# Patient Record
Sex: MALE | Employment: OTHER | URBAN - METROPOLITAN AREA
[De-identification: names, ages, dates, MRNs, and addresses within clinical notes are randomized per-mention and may not be internally consistent; named-entity substitution may affect disease eponyms.]

---

## 2018-06-13 ENCOUNTER — OFFICE VISIT (OUTPATIENT)
Dept: CARDIOLOGY CLINIC | Age: 68
End: 2018-06-13

## 2018-06-13 DIAGNOSIS — R06.02 SOB (SHORTNESS OF BREATH): Primary | ICD-10-CM

## 2018-06-13 DIAGNOSIS — I10 ESSENTIAL HYPERTENSION: ICD-10-CM

## 2018-06-13 DIAGNOSIS — Z76.89 ENCOUNTER TO ESTABLISH CARE: ICD-10-CM

## 2018-06-13 DIAGNOSIS — R53.83 FATIGUE, UNSPECIFIED TYPE: ICD-10-CM

## 2018-06-13 DIAGNOSIS — Z90.49 H/O COLECTOMY: ICD-10-CM

## 2018-06-13 PROBLEM — E78.2 MIXED HYPERLIPIDEMIA: Status: ACTIVE | Noted: 2018-06-13

## 2018-06-13 PROBLEM — E66.01 SEVERE OBESITY (BMI 35.0-39.9): Status: ACTIVE | Noted: 2018-06-13

## 2018-06-13 RX ORDER — CIPROFLOXACIN 500 MG/1
TABLET ORAL 2 TIMES DAILY
COMMUNITY

## 2018-06-13 RX ORDER — FLUTICASONE PROPIONATE AND SALMETEROL 250; 50 UG/1; UG/1
1 POWDER RESPIRATORY (INHALATION) DAILY
COMMUNITY

## 2018-06-13 RX ORDER — METRONIDAZOLE 500 MG/1
TABLET ORAL 2 TIMES DAILY
COMMUNITY

## 2018-06-13 RX ORDER — LISINOPRIL 10 MG/1
TABLET ORAL 2 TIMES DAILY
COMMUNITY

## 2018-06-13 RX ORDER — FENOFIBRATE 145 MG/1
TABLET, COATED ORAL DAILY
COMMUNITY
Start: 2018-04-20

## 2018-06-13 RX ORDER — ASPIRIN 81 MG/1
81 TABLET ORAL DAILY
Qty: 30 TAB | Refills: 11
Start: 2018-06-13

## 2018-06-13 NOTE — MR AVS SNAPSHOT
[address] [facility] 
[phone] Patient: Maggie Ott MRN: [id number] Visit Information Date & Time Provider Department Dept. Phone Encounter #  
 6/13/2018  2:00 PM Kary Lima, [address] [place] [address] [facility] [phone] [id number] Your Appointments 7/11/2018 12:30 PM  
ECHO CARDIOGRAMS 2D with ECHO, Steve  [address] [place] [address]) Appt Note: Dr. Macy Guzmán (Order [id number]) 6'0\" 3114D ECHO COMPLETE ADULT (TTE) W OR WO CONTR Brooke Mussel (Order [id number]) [address] [facility]  
[phone] [address] [facility] [address] [address]  
  
    
 7/11/2018  2:00 PM  
NUCLEAR MEDICINE with NUCLEAR, [facility] [address]) Appt Note: Dr. Macy Guzmán (Order [id number]) 6'0\" 1769V ECHO COMPLETE ADULT (TTE) W OR WO CONTR Brooke Mussel (Order [id number]) [address] [facility]  
[phone] [address] [facility] Upcoming Health Maintenance Date Due Hepatitis C Screening 1950 DTaP/Tdap/Td series (1 - Tdap) 10/9/1971 FOBT Q 1 YEAR AGE 50-75 10/9/2000 ZOSTER VACCINE AGE 60> 8/9/2010 GLAUCOMA SCREENING Q2Y 10/9/2015 Pneumococcal 65+ Low/Medium Risk (1 of 2 - PCV13) 10/9/2015 MEDICARE YEARLY EXAM 6/13/2018 Influenza Age 5 to Adult 8/1/2018 Allergies as of 6/13/2018  Review Complete On: 6/13/2018 By: Kary Lima MD  
 No Known Allergies Current Immunizations  Never Reviewed No immunizations on file. Not reviewed this visit You Were Diagnosed With   
  
 Codes Comments SOB (shortness of breath)    -  Primary ICD-10-CM: R06.02 
ICD-9-CM: 786.05 Fatigue, unspecified type     ICD-10-CM: R53.83 ICD-9-CM: 780.79   
 Encounter to establish care     ICD-10-CM: Z76.89 
ICD-9-CM: V65.8 Severe obesity (BMI 35.0-39.9) (HCC)     ICD-10-CM: E66.01 
ICD-9-CM: 278.01 Essential hypertension     ICD-10-CM: I10 
ICD-9-CM: 401.9 H/O colectomy     ICD-10-CM: Z90.49 ICD-9-CM: V45.89 Vitals BP Pulse Height(growth percentile) Weight(growth percentile) SpO2 BMI  
 110/78 (BP 1 Location: Right arm, BP Patient Position: Sitting) 100 6' (1.829 m) 263 lb (119.3 kg) 95% 35.67 kg/m2 Smoking Status Never Smoker Vitals History BMI and BSA Data Body Mass Index Body Surface Area  
 35.67 kg/m 2 2.46 m 2 Your Updated Medication List  
  
   
This list is accurate as of 6/13/18  3:50 PM.  Always use your most recent med list.  
  
  
  
  
 Geraldean Pyo 250-50 mcg/dose diskus inhaler Generic drug:  fluticasone-salmeterol Take 1 Puff by inhalation daily. aspirin delayed-release 81 mg tablet Take 1 Tab by mouth daily. ciprofloxacin HCl 500 mg tablet Commonly known as:  CIPRO Take  by mouth two (2) times a day. fenofibrate nanocrystallized 145 mg tablet Commonly known as:  TRICOR  
daily. lisinopril 10 mg tablet Commonly known as:  Nikki [place] Take  by mouth two (2) times a day. metroNIDAZOLE 500 mg tablet Commonly known as:  FLAGYL Take  by mouth two (2) times a day. We Performed the Following 2D ECHO COMPLETE ADULT (TTE) W OR WO CONTR [19359 CPT(R)] AMB POC EKG ROUTINE W/ 12 LEADS, INTER & REP [64729 CPT(R)] To-Do List   
 06/13/2018 ECG:  STRESS TEST CARDIOLITE Introducing [facility]! Jin Clark introduces [employer] patient portal. Now you can access parts of your medical record, email your doctor's office, and request medication refills online. 1. In your internet browser, go to [email or web address] 2. Click on the First Time User? Click Here link in the Sign In box. You will see the New Member Sign Up page. 3. Enter your [employer] Access Code exactly as it appears below. You will not need to use this code after youve completed the sign-up process. If you do not sign up before the expiration date, you must request a new code. · [employer] Access Code: [email or web address] Expires: 9/11/2018  3:43 PM 
 
4. Enter the last four digits of your Social Security Number (xxxx) and Date of Birth (mm/dd/yyyy) as indicated and click Submit. You will be taken to the next sign-up page. 5. Create a [employer] ID. This will be your [employer] login ID and cannot be changed, so think of one that is secure and easy to remember. 6. Create a [employer] password. You can change your password at any time. 7. Enter your Password Reset Question and Answer. This can be used at a later time if you forget your password. 8. Enter your e-mail address. You will receive e-mail notification when new information is available in [address] 9. Click Sign Up. You can now view and download portions of your medical record. 10. Click the Download Summary menu link to download a portable copy of your medical information. If you have questions, please visit the Frequently Asked Questions section of the [employer] website. Remember, [employer] is NOT to be used for urgent needs. For medical emergencies, dial 911. Now available from your iPhone and Android! Please provide this summary of care documentation to your next provider. Your primary care clinician is listed as [facility]. If you have any questions after today's visit, please call [phone].

## 2018-06-13 NOTE — PROGRESS NOTES
[address], [address] [facility]  [phone]     Subjective:      Shakir Lawrence is a 79 y.o. male with pmhx ulcerative colitis post colectomy, HTN, hypercholesterolemia, elevated BMI is here to establish care and further evaluation of worsening HESTER, nonproductive cough, lack of energy/fatigue x 1 mos. Also reports occasional left ankle swelling. Used to follow with a pulmonologist at [facility] and was told to have scarring in his lung, was given Advair. He denies cp, orthopnea, PND, palpitations, syncope, or presyncope. Cardiac risk factors: HTN, age, M, elevated BMI, sedentary lifestyle    Hx smoking: denies   Alcohol:rare   Illegal drug use:n/a    Family hx: mother:n/a  Father:n/a  Siblings:brother had valve replaced    Patient Active Problem List    Diagnosis Date Noted    H/O colectomy 06/13/2018    Mixed hyperlipidemia 06/13/2018      Harinder Salinas MD  No past medical history on file. No past surgical history on file. No Known Allergies   No family history on file. Social History     Social History    Marital status:      Spouse name: N/A    Number of children: N/A    Years of education: N/A     Occupational History    Not on file. Social History Main Topics    Smoking status: Never Smoker    Smokeless tobacco: Not on file    Alcohol use Not on file      Comment: 1/2 GLASS OF ONCE A MONTH    Drug use: No    Sexual activity: Not on file     Other Topics Concern    Not on file     Social History Narrative    No narrative on file      Current Outpatient Prescriptions   Medication Sig    lisinopril (PRINIVIL, ZESTRIL) 10 mg tablet Take  by mouth two (2) times a day.  ciprofloxacin HCl (CIPRO) 500 mg tablet Take  by mouth two (2) times a day.  fluticasone-salmeterol (ADVAIR DISKUS) 250-50 mcg/dose diskus inhaler Take 1 Puff by inhalation daily.  fenofibrate nanocrystallized (TRICOR) 145 mg tablet daily.     metroNIDAZOLE (FLAGYL) 500 mg tablet Take  by mouth two (2) times a day.  aspirin delayed-release 81 mg tablet Take 1 Tab by mouth daily. No current facility-administered medications for this visit. Review of Symptoms:  11 systems reviewed, negative other than as stated in the HPI    Physical ExamPhysical Exam:    Vitals:    06/13/18 1400 06/13/18 1423   BP: 114/80 110/78   Pulse: 100    SpO2: 95%    Weight: 213 lb (96.6 kg)    Height: 6' (1.829 m)      Body mass index is 28.89 kg/(m^2). General PE   Gen:  NAD  Mental Status - Alert. General Appearance - Not in acute distress. Chest and Lung Exam   Inspection: Accessory muscles - No use of accessory muscles in breathing. Auscultation:   Breath sounds: - Normal.   Cardiovascular   Inspection: Jugular vein - Bilateral - Inspection Normal.   Palpation/Percussion:   Apical Impulse: - Normal.   Auscultation: Rhythm - Regular. Heart Sounds - S1 WNL and S2 WNL. No S3 or S4. Murmurs & Other Heart Sounds: Auscultation of the heart reveals - No Murmurs. Peripheral Vascular   Upper Extremity: Inspection - Bilateral - No Cyanotic nailbeds or Digital clubbing. Lower Extremity:   Palpation: Edema - Bilateral - mild L ankle edema  Abdomen:   Soft, non-tender, bowel sounds are active. Neuro: A&O times 3, CN and motor grossly WNL  SKin: diffused rash bilateral lower arms (new)    Labs:   No results found for: CHOL, CHOLX, CHLST, CHOLV, 245704, HDL, LDL, LDLC, DLDLP, TGLX, TRIGL, TRIGP, CHHD, CHHDX  No results found for: CPK, CPKX, CPX  No results found for: NA, K, CL, CO2, AGAP, GLU, BUN, CREA, BUCR, GFRAA, GFRNA, CA, TBIL, TBILI, GPT, SGOT, AP, TP, ALB, GLOB, AGRAT, ALT    EKG:  SR, RBBB     Assessment:     Assessment:      1. SOB (shortness of breath)    2. Fatigue, unspecified type    3. Encounter to establish care    4. Essential hypertension    5.  H/O colectomy        Orders Placed This Encounter    AMB POC EKG ROUTINE W/ 12 LEADS, INTER & REP     Order Specific Question:   Reason for Exam:     Answer:   ROUTINE    STRESS TEST CARDIOLITE, Clinic Performed     Standing Status:   Future     Standing Expiration Date:   12/13/2018     Order Specific Question:   Reason for Exam:     Answer:   hester    2D ECHO COMPLETE ADULT (TTE) W OR WO CONTR     Order Specific Question:   Reason for Exam:     Answer:   hester     Order Specific Question:   Contrast Enhancement (Bubble Study, Definity, Optison) may be used if criteria listed in established evidence-based protocol has been identified. Answer: Yes    lisinopril (PRINIVIL, ZESTRIL) 10 mg tablet     Sig: Take  by mouth two (2) times a day.  ciprofloxacin HCl (CIPRO) 500 mg tablet     Sig: Take  by mouth two (2) times a day.  fluticasone-salmeterol (ADVAIR DISKUS) 250-50 mcg/dose diskus inhaler     Sig: Take 1 Puff by inhalation daily.  fenofibrate nanocrystallized (TRICOR) 145 mg tablet     Sig: daily.  metroNIDAZOLE (FLAGYL) 500 mg tablet     Sig: Take  by mouth two (2) times a day.  aspirin delayed-release 81 mg tablet     Sig: Take 1 Tab by mouth daily. Dispense:  30 Tab     Refill:  11        Plan: This is a 79 y.o. male with pmhx ulcerative colitis post colectomy, HTN, hypercholesterolemia, elevated BMI is here to establish care and further evaluation of worsening HESTER, nonproductive cough, lack of energy/fatigue x 1 mos. Also reports occasional left ankle swelling. Used to follow with a pulmonologist at [facility] and was told to have scarring in his lung, was given Advair. HESTER, fatigue  EKG SR with RBBB. No cervical and supraclavicular lymphadenopathy. Denies recent wt loss  States thyroid/vitamin d level/B12 etc labwork done by PCP. Will obtain baseline echocardiogram to evaluate for structural heart disease and nuclear exercise stress test    HTN  Controlled with therapy    Lipids and labs followed by PCP. States lung scarring, ?  Fibrosis  Recommend to see pulmonary associates    Follow up in 6 months if testing normal and explanation for shortness of breath found by PCP or pulmonary after gradual increase exercise and improved diet. If persistent severe shortness of breath that is unexplained despite seeing other doctors, come back sooner. He understands there is a small risk of a false negative stress test.    Sandra [place]ins MD     Chart addendum 7/11/18:  Weight incorrectly entered on initial encounter. Diagnosis ofsevereobesity is in error and removed from chart. BMI of 28.9 is NOT in the obese range.

## 2018-06-13 NOTE — PROGRESS NOTES
Chief Complaint   Patient presents with    Fatigue    Shortness of Breath    Difficulty Walking    Other     PT. SLEEPS ALOT    Cough    Ankle swelling     PT. C/OSWELLING IN LT. ANKLE       1. Have you been to the ER, urgent care clinic since your last visit? Hospitalized since your last visit? YES, [facility] 8/17 FOR BOWEL OBSTRUCTION. 2. Have you seen or consulted any other health care providers outside of the [address] [place] [address] since your last visit? Include any pap smears or colon screening.  YES, HIS PCP

## 2018-07-11 ENCOUNTER — TELEPHONE (OUTPATIENT)
Dept: CARDIOLOGY CLINIC | Age: 68
End: 2018-07-11

## 2018-07-11 VITALS
HEART RATE: 100 BPM | BODY MASS INDEX: 28.85 KG/M2 | DIASTOLIC BLOOD PRESSURE: 78 MMHG | WEIGHT: 213 LBS | HEIGHT: 72 IN | OXYGEN SATURATION: 95 % | SYSTOLIC BLOOD PRESSURE: 110 MMHG

## 2018-07-11 NOTE — TELEPHONE ENCOUNTER
See addendum in initial note- diagnosis removed. Please notify patient apologies for incorrectly recorded weight.

## 2018-07-11 NOTE — TELEPHONE ENCOUNTER
.Verified patient with two identifiers. Pt walked into office demanded chart be changed to correct weight. Weight today to 213.4 lbs, will talk with Dr. Joaquim Bucio to make an addendum on chart and mail new AVS to pt.

## 2018-07-20 ENCOUNTER — CLINICAL SUPPORT (OUTPATIENT)
Dept: CARDIOLOGY CLINIC | Age: 68
End: 2018-07-20

## 2018-07-20 DIAGNOSIS — I10 ESSENTIAL HYPERTENSION: ICD-10-CM

## 2018-07-20 DIAGNOSIS — R53.83 FATIGUE, UNSPECIFIED TYPE: ICD-10-CM

## 2018-07-20 DIAGNOSIS — R06.09 DOE (DYSPNEA ON EXERTION): Primary | ICD-10-CM

## 2018-07-20 DIAGNOSIS — R06.02 SOB (SHORTNESS OF BREATH): ICD-10-CM

## 2018-07-20 DIAGNOSIS — Z76.89 ENCOUNTER TO ESTABLISH CARE: ICD-10-CM

## 2018-07-23 ENCOUNTER — TELEPHONE (OUTPATIENT)
Dept: CARDIOLOGY CLINIC | Age: 68
End: 2018-07-23

## 2018-07-23 NOTE — PROGRESS NOTES
Stress test and echo are normal.  Follow-up with pulmonary as planned. If doing okay, gradually increase exercise, follow-up in 6 months. Sooner if progressive symptoms not explained by his lungs.

## 2018-07-23 NOTE — TELEPHONE ENCOUNTER
Spouse informed Verified patient with two identifiers on [place] will check with PCP about Pulmonary referral..

## 2018-07-23 NOTE — TELEPHONE ENCOUNTER
----- Message from Kristan Chris MD sent at 7/23/2018  3:49 PM EDT -----  Stress test and echo are normal.  Follow-up with pulmonary as planned. If doing okay, gradually increase exercise, follow-up in 6 months. Sooner if progressive symptoms not explained by his lungs.

## 2018-07-26 NOTE — PROGRESS NOTES
Echo showed normal pumping strength of heart. No valve problems. Some abnormality with the ventricles relaxing. Treatment is to manage blood pressure.    Thanks

## 2018-07-27 ENCOUNTER — TELEPHONE (OUTPATIENT)
Dept: CARDIOLOGY CLINIC | Age: 68
End: 2018-07-27

## 2018-07-27 NOTE — TELEPHONE ENCOUNTER
----- Message from Moose Miranda NP sent at 7/26/2018  3:42 PM EDT -----  Echo showed normal pumping strength of heart. No valve problems. Some abnormality with the ventricles relaxing. Treatment is to manage blood pressure.    Thanks

## 2018-07-27 NOTE — TELEPHONE ENCOUNTER
Ke Lerma on [address] Verified patient with two identifiers notified appt with pulmonary in Sept scheduled.

## 2018-09-13 ENCOUNTER — HOSPITAL ENCOUNTER (OUTPATIENT)
Dept: GENERAL RADIOLOGY | Age: 68
Discharge: HOME | End: 2018-09-13
Payer: MEDICARE

## 2018-09-13 DIAGNOSIS — R06.02 SOB (SHORTNESS OF BREATH): ICD-10-CM

## 2018-09-13 PROCEDURE — 71046 X-RAY EXAM CHEST 2 VIEWS: CPT

## 2019-05-06 ENCOUNTER — HOSPITAL ENCOUNTER (OUTPATIENT)
Dept: GENERAL RADIOLOGY | Age: 69
Discharge: HOME | End: 2019-05-06
Payer: MEDICARE

## 2019-05-06 DIAGNOSIS — R93.89 ABNORMAL CHEST XRAY: ICD-10-CM

## 2019-05-06 PROCEDURE — 71046 X-RAY EXAM CHEST 2 VIEWS: CPT

## 2020-08-12 ENCOUNTER — HOSPITAL ENCOUNTER (OUTPATIENT)
Dept: GENERAL RADIOLOGY | Age: 70
Discharge: HOME | End: 2020-08-12
Payer: MEDICARE

## 2020-08-12 DIAGNOSIS — J44.9 OBSTRUCTIVE CHRONIC BRONCHITIS WITHOUT EXACERBATION (HCC): ICD-10-CM

## 2020-08-12 PROCEDURE — 71046 X-RAY EXAM CHEST 2 VIEWS: CPT

## 2022-03-19 PROBLEM — Z90.49 H/O COLECTOMY: Status: ACTIVE | Noted: 2018-06-13

## 2022-03-19 PROBLEM — E78.2 MIXED HYPERLIPIDEMIA: Status: ACTIVE | Noted: 2018-06-13

## 2024-01-25 ENCOUNTER — HOSPITAL ENCOUNTER (OUTPATIENT)
Facility: HOSPITAL | Age: 74
Discharge: HOME | End: 2024-01-25
Payer: MEDICARE

## 2024-01-25 DIAGNOSIS — M85.80 OSTEOPENIA, UNSPECIFIED LOCATION: ICD-10-CM

## 2024-01-25 DIAGNOSIS — M54.50 LOW BACK PAIN, UNSPECIFIED BACK PAIN LATERALITY, UNSPECIFIED CHRONICITY, UNSPECIFIED WHETHER SCIATICA PRESENT: ICD-10-CM

## 2024-01-25 PROCEDURE — 72100 X-RAY EXAM L-S SPINE 2/3 VWS: CPT
